# Patient Record
Sex: FEMALE | Race: WHITE | Employment: STUDENT | URBAN - METROPOLITAN AREA
[De-identification: names, ages, dates, MRNs, and addresses within clinical notes are randomized per-mention and may not be internally consistent; named-entity substitution may affect disease eponyms.]

---
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
## 2024-05-16 ENCOUNTER — OFFICE VISIT (OUTPATIENT)
Dept: ORTHOPEDIC SURGERY | Age: 19
End: 2024-05-16
Payer: COMMERCIAL

## 2024-05-16 DIAGNOSIS — S52.124A CLOSED NONDISPLACED FRACTURE OF HEAD OF RIGHT RADIUS, INITIAL ENCOUNTER: Primary | ICD-10-CM

## 2024-05-16 DIAGNOSIS — S59.901A INJURY OF RIGHT ELBOW, INITIAL ENCOUNTER: ICD-10-CM

## 2024-05-16 PROCEDURE — 99203 OFFICE O/P NEW LOW 30 MIN: CPT | Performed by: PHYSICIAN ASSISTANT

## 2024-05-16 RX ORDER — DULOXETIN HYDROCHLORIDE 20 MG/1
CAPSULE, DELAYED RELEASE ORAL
COMMUNITY
Start: 2024-03-29

## 2024-05-16 RX ORDER — IBUPROFEN 800 MG/1
800 TABLET ORAL EVERY 8 HOURS PRN
Qty: 21 TABLET | Refills: 0 | Status: SHIPPED | OUTPATIENT
Start: 2024-05-16 | End: 2024-05-23

## 2024-05-16 NOTE — PROGRESS NOTES
[facility]  New Patient Note      CHIEF COMPLAINT:   Chief Complaint   Patient presents with    Arm Pain     Pt presents this AM with c/o pain in his R elbow. States that he fell yesterday evening. YOSHI. Pain is limiting ROM, specifically into extension. Pain is throughout the joint, and is also noted in distal biceps. Has taken Tylenol and Ibuprofen to manage his symptoms. Pt is R handed.        HISTORY OF PRESENT ILLNESS:                The patient is a 18 y.o. adult who presents today with complaints of right elbow pain x 1 day, followed after he FOOSH yesterday.  Pt has difficulty localizing the pain states it is global throughout the elbow.  Pt denies any numbness, tingling, loss of sensation or radiation of symptoms into fingers.  Pain is worse with palpation and range of motion specifically extension.  They have tried at home therapies of ibuprofen or Tylenol for symptomatic relief as well as ice.  Pt has never injured this elbow in the past.  Pt is right hand dominant.        Past Medical History:        Diagnosis Date    Anxiety     Chronic throat pain     Environmental and seasonal allergies      Past Surgical History:        Procedure Laterality Date    ESOPHAGOGASTRODUODENOSCOPY N/A     MASTECTOMY, BILATERAL Bilateral 2021    TONSILLECTOMY N/A 7/11/2023    TONSILLECTOMY POSTOP HEMORRHAGE CONTROL performed by Dilip Acevedo DO at [facility] OR    TONSILLECTOMY AND ADENOIDECTOMY Bilateral 7/5/2023    TONSILLECTOMY ADENOIDECTOMY performed by Derrek Rodrigues DO at [facility] OR     Current Medications:   No current facility-administered medications for this visit.  Allergies:  Neomycin    Social History:   TOBACCO:   reports that he has never smoked. He has never used smokeless tobacco.  ETOH:   reports current alcohol use.  DRUGS:   reports no history of drug use.    Review of Systems   Constitutional: Negative for fever, chills, diaphoresis, appetite change and fatigue.   HENT:

## 2024-05-16 NOTE — PATIENT INSTRUCTIONS
*At this time I have recommended starting an anti-inflammatory, Ibuprofen 800mg 1 tablet by mouth daily every 8 hours as needed for pain, with GI precautions.  If patient would develop any GI upset, nausea, vomiting, change in appetite, blood in stools they should discontinue the medication and contact our office immediately.  They are also aware that they should not take any other over-the-counter anti-inflammatories while on this.  They can use Tylenol OTC PRN.

## 2024-05-21 ENCOUNTER — OFFICE VISIT (OUTPATIENT)
Dept: ORTHOPEDIC SURGERY | Age: 19
End: 2024-05-21
Payer: COMMERCIAL

## 2024-05-21 VITALS — HEIGHT: 65 IN | BODY MASS INDEX: 23.99 KG/M2 | WEIGHT: 144 LBS

## 2024-05-21 DIAGNOSIS — M25.531 RIGHT WRIST PAIN: ICD-10-CM

## 2024-05-21 DIAGNOSIS — S52.124A CLOSED NONDISPLACED FRACTURE OF HEAD OF RIGHT RADIUS, INITIAL ENCOUNTER: Primary | ICD-10-CM

## 2024-05-21 PROCEDURE — 99213 OFFICE O/P EST LOW 20 MIN: CPT | Performed by: PHYSICIAN ASSISTANT

## 2024-05-21 NOTE — PROGRESS NOTES
elbow.  He is missing approximately 1-2 degrees of extension and approximately 5 degrees of flexion.  He does have discomfort with supported supination and pronation but this has improved since last visit.  Strong radial pulse noted.    Radiology Imaging:  3 view right wrist x-rays obtained in the clinic today which were negative for acute findings of fracture or dislocation.    The imaging will be reviewed and interpreted by Radiologist.  The report was not complete at the time of this note.  Please refer to Radiologist report for their interpretation.        Procedure:  None    Assessment:  Encounter Diagnoses   Name Primary?    Closed nondisplaced fracture of head of right radius, initial encounter Yes    Right wrist pain      Plan:  Pt returns to the clinic today for follow up of nondisplaced right sided radial head fracture.  Today he also complains of right wrist pain.  He states his pain has improved over the last few days.  On exam of the wrist he is mildly tender to palpation at the distal radius.  He does not have much tenderness to palpation at the scaphoid.  I do have to press pretty firmly to elicit any type of discomfort.  He states it is not painful with palpation it does feel different.  The remainder of the hand and wrist exam is unremarkable.  We did did discuss with mom via phone we are going to hold off on getting an MRI of the wrist at this time. I think it is unlikely that there is a scaphoid fracture based on the improvement of his symptoms and minimal tenderness to firm palpation of the anatomical snuffbox.  As for his elbow, I want him to continue table slides.  He has not been doing supported supination pronation as part of his home exercise program.  I do want him to incorporate this at this time.  Wearing to closely monitor his wrist and elbow.  If he reaches a plateau where he is not seeing improvement in the elbow in regards to the last few degrees of range of motion that I do need him

## 2024-06-04 ENCOUNTER — OFFICE VISIT (OUTPATIENT)
Dept: ORTHOPEDIC SURGERY | Age: 19
End: 2024-06-04
Payer: COMMERCIAL

## 2024-06-04 VITALS — BODY MASS INDEX: 23.99 KG/M2 | HEIGHT: 65 IN | WEIGHT: 144 LBS

## 2024-06-04 DIAGNOSIS — S52.124A CLOSED NONDISPLACED FRACTURE OF HEAD OF RIGHT RADIUS, INITIAL ENCOUNTER: Primary | ICD-10-CM

## 2024-06-04 PROCEDURE — 99212 OFFICE O/P EST SF 10 MIN: CPT | Performed by: PHYSICIAN ASSISTANT

## 2024-06-04 NOTE — PROGRESS NOTES
transcribed using voice recognition software. Every effort was made to ensure accuracy; however, inadvertent computerized transcription errors may be present.**

## 2024-07-09 ENCOUNTER — OFFICE VISIT (OUTPATIENT)
Dept: FAMILY MEDICINE CLINIC | Age: 19
End: 2024-07-09
Payer: COMMERCIAL

## 2024-07-09 VITALS
RESPIRATION RATE: 16 BRPM | WEIGHT: 144 LBS | HEIGHT: 65 IN | HEART RATE: 93 BPM | SYSTOLIC BLOOD PRESSURE: 106 MMHG | DIASTOLIC BLOOD PRESSURE: 56 MMHG | BODY MASS INDEX: 23.99 KG/M2 | TEMPERATURE: 98.8 F | OXYGEN SATURATION: 98 %

## 2024-07-09 DIAGNOSIS — Z13.31 POSITIVE DEPRESSION SCREENING: ICD-10-CM

## 2024-07-09 DIAGNOSIS — F41.0 PANIC ATTACK: Primary | ICD-10-CM

## 2024-07-09 PROCEDURE — 99213 OFFICE O/P EST LOW 20 MIN: CPT | Performed by: FAMILY MEDICINE

## 2024-07-09 RX ORDER — LORATADINE 10 MG/1
10 TABLET ORAL DAILY PRN
Status: SHIPPED | COMMUNITY
Start: 2024-07-09

## 2024-07-09 RX ORDER — SERTRALINE HYDROCHLORIDE 25 MG/1
TABLET, FILM COATED ORAL
Qty: 60 TABLET | Refills: 0 | Status: SHIPPED | OUTPATIENT
Start: 2024-07-09

## 2024-07-09 ASSESSMENT — PATIENT HEALTH QUESTIONNAIRE - PHQ9
4. FEELING TIRED OR HAVING LITTLE ENERGY: MORE THAN HALF THE DAYS
SUM OF ALL RESPONSES TO PHQ QUESTIONS 1-9: 13
7. TROUBLE CONCENTRATING ON THINGS, SUCH AS READING THE NEWSPAPER OR WATCHING TELEVISION: NEARLY EVERY DAY
9. THOUGHTS THAT YOU WOULD BE BETTER OFF DEAD, OR OF HURTING YOURSELF: NOT AT ALL
5. POOR APPETITE OR OVEREATING: MORE THAN HALF THE DAYS
SUM OF ALL RESPONSES TO PHQ9 QUESTIONS 1 & 2: 4
6. FEELING BAD ABOUT YOURSELF - OR THAT YOU ARE A FAILURE OR HAVE LET YOURSELF OR YOUR FAMILY DOWN: NOT AT ALL
3. TROUBLE FALLING OR STAYING ASLEEP: MORE THAN HALF THE DAYS
1. LITTLE INTEREST OR PLEASURE IN DOING THINGS: SEVERAL DAYS
10. IF YOU CHECKED OFF ANY PROBLEMS, HOW DIFFICULT HAVE THESE PROBLEMS MADE IT FOR YOU TO DO YOUR WORK, TAKE CARE OF THINGS AT HOME, OR GET ALONG WITH OTHER PEOPLE: SOMEWHAT DIFFICULT
8. MOVING OR SPEAKING SO SLOWLY THAT OTHER PEOPLE COULD HAVE NOTICED. OR THE OPPOSITE - BEING SO FIDGETY OR RESTLESS THAT YOU HAVE BEEN MOVING AROUND A LOT MORE THAN USUAL: NOT AT ALL
2. FEELING DOWN, DEPRESSED OR HOPELESS: NEARLY EVERY DAY

## 2024-07-09 NOTE — PROGRESS NOTES
[facility]  Department of Family Medicine  Family Medicine Residency Program      Patient:  Korey Kirkpatrick 19 y.o. adult  Date of Service: 7/9/24      Chief complaint:   Chief Complaint   Patient presents with    Mood Swings       Assessment and Plan   1. Panic attack  Symptoms most likely explained by mild panic.  Discussed possibility of various meds to assist with this and he is currently moving to Zoloft which seemed to work better in the past.  Overall the environment at school is much more palatable and symptoms are much better controlled when at school.      2. Positive depression screening  No active suicidal thoughts has follow-up with GYN for PCP.      Return to Office: Return if symptoms worsen or fail to improve.    History ofPresent Illness   The patient is a 19 y.o. adult  presented to the clinic with complaints as above.    Has been off Testosterone gel fully for about 2 months.   Has been on norethindrone daily  Duloxetine off for 2 weeks  Zoloft 100 mg on this for 2 weeks.    Chest sx - reports wiers sensation reported like \"fumes\" in chest that goes from xyphoid region into the throat.  Feels like something eating away at the chest.  Occurring consistently but worsens at time. He has had acid reflux in the past and usually spits up acid when this occurs - this current sensation feels nothing like acid reflux.  Also repots history of panic that was treated with zoloft which suppressed but did not resolve the panic sx.  This sensation may have been present prior to the rotation to cymbalta but it might not have b/c if it was it was intermittent and much less intense.  Rotation to cymbalta occurred around Feb 2024 and this occurred and worsened.  This sensation was strongest when on the cymbalta.  The rotation back to zoloft has helped.  Intensity is about 1/2 of what it was previous (on cymbalta) but can still increase that much.  Having 8-9 episodes in a day where the

## 2024-07-19 ENCOUNTER — OFFICE VISIT (OUTPATIENT)
Dept: FAMILY MEDICINE CLINIC | Age: 19
End: 2024-07-19
Payer: COMMERCIAL

## 2024-07-19 VITALS
DIASTOLIC BLOOD PRESSURE: 75 MMHG | OXYGEN SATURATION: 97 % | RESPIRATION RATE: 18 BRPM | SYSTOLIC BLOOD PRESSURE: 108 MMHG | TEMPERATURE: 97.9 F | WEIGHT: 142 LBS | BODY MASS INDEX: 23.63 KG/M2 | HEART RATE: 90 BPM

## 2024-07-19 DIAGNOSIS — E16.2 HYPOGLYCEMIA: Primary | ICD-10-CM

## 2024-07-19 DIAGNOSIS — R00.2 PALPITATIONS: ICD-10-CM

## 2024-07-19 DIAGNOSIS — Z51.81 ENCOUNTER FOR MONITORING TESTOSTERONE REPLACEMENT THERAPY: ICD-10-CM

## 2024-07-19 DIAGNOSIS — R82.998 INAPPROPRIATELY LOW URINE SPECIFIC GRAVITY: Primary | ICD-10-CM

## 2024-07-19 DIAGNOSIS — Z79.890 ENCOUNTER FOR MONITORING TESTOSTERONE REPLACEMENT THERAPY: ICD-10-CM

## 2024-07-19 PROBLEM — J95.830 POST TONSILLECTOMY SECONDARY HEMORRHAGE: Status: RESOLVED | Noted: 2023-07-11 | Resolved: 2024-07-19

## 2024-07-19 PROBLEM — J35.01 CHRONIC TONSILLITIS: Status: RESOLVED | Noted: 2023-06-08 | Resolved: 2024-07-19

## 2024-07-19 PROBLEM — J95.830 POST-TONSILLECTOMY HEMORRHAGE: Status: RESOLVED | Noted: 2023-07-11 | Resolved: 2024-07-19

## 2024-07-19 PROBLEM — G89.18 POST-OP PAIN: Status: RESOLVED | Noted: 2023-07-10 | Resolved: 2024-07-19

## 2024-07-19 LAB
ALBUMIN: 4.8 G/DL (ref 3.5–5.2)
ALP BLD-CCNC: 104 U/L (ref 35–104)
ALT SERPL-CCNC: 10 U/L (ref 0–32)
ANION GAP SERPL CALCULATED.3IONS-SCNC: 15 MMOL/L (ref 7–16)
AST SERPL-CCNC: 18 U/L (ref 0–31)
BILIRUB SERPL-MCNC: 0.3 MG/DL (ref 0–1.2)
BILIRUBIN, POC: NORMAL
BLOOD URINE, POC: NORMAL
BUN BLDV-MCNC: 9 MG/DL (ref 6–20)
CALCIUM SERPL-MCNC: 9.4 MG/DL (ref 8.6–10.2)
CHLORIDE BLD-SCNC: 101 MMOL/L (ref 98–107)
CHP ED QC CHECK: NORMAL
CLARITY, POC: CLEAR
CO2: 20 MMOL/L (ref 22–29)
COLOR, POC: NORMAL
CREAT SERPL-MCNC: 0.7 MG/DL (ref 0.5–1)
GFR, ESTIMATED: >90 ML/MIN/1.73M2
GLUCOSE BLD-MCNC: 84 MG/DL (ref 74–99)
GLUCOSE BLD-MCNC: 93 MG/DL
GLUCOSE URINE, POC: NORMAL
HBA1C MFR BLD: 5.2 % (ref 4–5.6)
KETONES, POC: NORMAL
LEUKOCYTE EST, POC: NORMAL
MAGNESIUM: 2.3 MG/DL (ref 1.6–2.6)
NITRITE, POC: NORMAL
OSMOLALITY URINE: 357 MOSM/KG (ref 300–900)
PH, POC: 6.5
POTASSIUM SERPL-SCNC: 4.5 MMOL/L (ref 3.5–5)
PROTEIN, POC: NORMAL
SODIUM BLD-SCNC: 136 MMOL/L (ref 132–146)
SPECIFIC GRAVITY, POC: 1.01
T3 FREE: 2.8 PG/ML (ref 2–4.4)
T4 FREE: 1.4 NG/DL (ref 0.9–1.7)
TESTOSTERONE TOTAL: 20 NG/DL (ref 8–48)
TOTAL PROTEIN: 8.1 G/DL (ref 6.4–8.3)
TSH SERPL DL<=0.05 MIU/L-ACNC: 2.04 UIU/ML (ref 0.27–4.2)
UROBILINOGEN, POC: 0.2

## 2024-07-19 PROCEDURE — 81002 URINALYSIS NONAUTO W/O SCOPE: CPT | Performed by: FAMILY MEDICINE

## 2024-07-19 PROCEDURE — 99214 OFFICE O/P EST MOD 30 MIN: CPT | Performed by: FAMILY MEDICINE

## 2024-07-19 PROCEDURE — 82962 GLUCOSE BLOOD TEST: CPT | Performed by: FAMILY MEDICINE

## 2024-07-19 ASSESSMENT — ENCOUNTER SYMPTOMS
ABDOMINAL PAIN: 0
TROUBLE SWALLOWING: 0
SHORTNESS OF BREATH: 0
NAUSEA: 1

## 2024-07-19 NOTE — PROGRESS NOTES
Korey Kirkpatrick is a 19 y.o. year old male Established patient, here for evaluation of the following chief complaint(s):    Chief Complaint   Patient presents with    Blood Sugar Problem     States he's been checking blood sugars at home and they have been getting into the 40's recently.         Korey was seen today for blood sugar problem.    Diagnoses and all orders for this visit:    Hypoglycemia  -     POCT Glucose  -     Cancel: Comprehensive Metabolic Panel  -     Cancel: Hemoglobin A1C  -     Cancel: TSH  -     Cancel: Insulin, total; Future  -     Cancel: Insulin Antibody; Future  -     Cancel: C-Peptide; Future  -     T3, Free  -     Cancel: T4, Free; Future  -     POCT Urinalysis no Micro  -     TSH  -     Insulin, total  -     Insulin Antibody  -     T4, Free  -     Hemoglobin A1C  -     Comprehensive Metabolic Panel  -     C-Peptide    Palpitations  -     Cancel: Magnesium  -     Cardiac holter monitor (1 day-2 day); Future  -     Magnesium    Encounter for monitoring testosterone replacement therapy  -     Cancel: TSH  -     Cancel: Testosterone  -     TSH  -     Testosterone      Plan    Differential diagnosis for hypoglycemia reviewed.  Insulinoma versus exogenous administration of insulin versus uncontrolled diabetes or impaired glucose tolerance versus other endocrinologic problems.  Checking labs as above to look for metabolic dysfunction or dysregulation    Concern over palpitations will pursue 2-day Holter monitor along with magnesium check    Checking testosterone levels    Patient/guardian was given the opportunity to ask questions regarding the visit today.  Questions were addressed.  They verbalized comfort and understanding of the assessment and plan.    Return for 1-2 weeks after imaging/testing to review.    SUBJECTIVE/OBJECTIVE:    HPI  Concerns for hypoglycemia  Patient has been experiencing vague symptoms of midline chest discomfort, he describes it as a \"black smoke\" sensation that

## 2024-07-23 LAB
C-PEPTIDE: 2.1 NG/ML (ref 1.1–4.4)
INSULIN COMMENT: NORMAL
INSULIN REFERENCE RANGE:: NORMAL
INSULIN: 10.8 MU/L

## 2024-07-26 LAB — HUMAN INSULIN ANTIBODY: <0.4 U/ML (ref 0–0.4)

## 2024-08-02 ENCOUNTER — OFFICE VISIT (OUTPATIENT)
Dept: FAMILY MEDICINE CLINIC | Age: 19
End: 2024-08-02

## 2024-08-02 VITALS
OXYGEN SATURATION: 98 % | WEIGHT: 142.2 LBS | HEIGHT: 65 IN | TEMPERATURE: 97.6 F | HEART RATE: 95 BPM | SYSTOLIC BLOOD PRESSURE: 91 MMHG | BODY MASS INDEX: 23.69 KG/M2 | DIASTOLIC BLOOD PRESSURE: 63 MMHG

## 2024-08-02 DIAGNOSIS — Z51.81 ENCOUNTER FOR MONITORING TESTOSTERONE REPLACEMENT THERAPY: Primary | ICD-10-CM

## 2024-08-02 DIAGNOSIS — Z79.890 ENCOUNTER FOR MONITORING TESTOSTERONE REPLACEMENT THERAPY: Primary | ICD-10-CM

## 2024-08-02 DIAGNOSIS — F64.0 TRANSGENDER PERSON ON HORMONE THERAPY: ICD-10-CM

## 2024-08-02 DIAGNOSIS — Z79.899 TRANSGENDER PERSON ON HORMONE THERAPY: ICD-10-CM

## 2024-08-02 DIAGNOSIS — J34.89 SORE IN NOSE: ICD-10-CM

## 2024-08-02 PROBLEM — F64.9 GENDER DYSPHORIA: Status: ACTIVE | Noted: 2021-01-20

## 2024-08-02 RX ORDER — TESTOSTERONE 20.25 MG/1.25G
1 GEL TOPICAL DAILY
Qty: 225 G | Refills: 1 | Status: SHIPPED | OUTPATIENT
Start: 2024-08-02 | End: 2025-01-29

## 2024-08-02 RX ORDER — DOXYCYCLINE HYCLATE 100 MG
100 TABLET ORAL 2 TIMES DAILY
Qty: 14 TABLET | Refills: 0 | Status: SHIPPED | OUTPATIENT
Start: 2024-08-02 | End: 2024-08-09

## 2024-08-02 NOTE — PROGRESS NOTES
Korey Kirkpatrick is a 19 y.o. year old male Established patient, here for evaluation of the following chief complaint(s):    Chief Complaint   Patient presents with    Discuss Medications         Korey was seen today for discuss medications.    Diagnoses and all orders for this visit:    Encounter for monitoring testosterone replacement therapy  -     Testosterone 1.62 % GEL; Place 1 actuation onto the skin daily Max Daily Amount: 1 actuation  -     CBC with Auto Differential; Future  -     Comprehensive Metabolic Panel; Future  -     Testosterone; Future  -     Lipid Panel; Future    Transgender person on hormone therapy  -     CBC with Auto Differential; Future  -     Comprehensive Metabolic Panel; Future  -     Testosterone; Future  -     Lipid Panel; Future    Sore in nose  -     doxycycline hyclate (VIBRA-TABS) 100 MG tablet; Take 1 tablet by mouth 2 times daily for 7 days      Plan  Restart testosterone therapy through this office.  Risks benefits and side effects of therapy reviewed with patient  Controlled substances monitoring: possible medication side effects, risk of tolerance and/or dependence, and alternative treatments discussed, OARRS report reviewed today- activity consistent with treatment plan, and medication contract signed today.  Need routine labs every 3 months until levels have stabilized.  Advised would like to have a in person or video visit with the patient roughly every 3 months until issues stabilize    Sore in the nose not responding to topical antibiotics, trial of oral antibiotics.  Side effects including sun sensitivity while on this medication reviewed.  Notify if not improving after completing therapy      Patient/guardian was given the opportunity to ask questions regarding the visit today.  Questions were addressed.  They verbalized comfort and understanding of the assessment and plan.    Return in about 3 months (around 11/2/2024), or if symptoms worsen or fail to improve, for

## 2024-09-23 RX ORDER — SERTRALINE HYDROCHLORIDE 100 MG/1
150 TABLET, FILM COATED ORAL DAILY
Qty: 135 TABLET | Refills: 0 | Status: SHIPPED | OUTPATIENT
Start: 2024-09-23

## 2024-09-23 RX ORDER — SERTRALINE HYDROCHLORIDE 25 MG/1
TABLET, FILM COATED ORAL
Qty: 60 TABLET | Refills: 0 | Status: CANCELLED | OUTPATIENT
Start: 2024-09-23

## 2024-10-08 ENCOUNTER — PATIENT MESSAGE (OUTPATIENT)
Dept: OBGYN | Age: 19
End: 2024-10-08

## 2024-10-22 NOTE — TELEPHONE ENCOUNTER
Good Dori Welch,    I just wanted to keep you abreast of the prior authorization situation. Since my last message, I have sent it through two more companies and both stated they were not in Network.  Dr. Yang is aware and I see you have a phone visit scheduled with her to discuss.   Should anything else arise, such as an approval or another company we have found to send a prior authorization, I will be sure to notify you. We apologize for the delay.  Enjoy the rest of your day!

## 2024-11-03 SDOH — ECONOMIC STABILITY: FOOD INSECURITY: WITHIN THE PAST 12 MONTHS, YOU WORRIED THAT YOUR FOOD WOULD RUN OUT BEFORE YOU GOT MONEY TO BUY MORE.: NEVER TRUE

## 2024-11-03 SDOH — ECONOMIC STABILITY: FOOD INSECURITY: WITHIN THE PAST 12 MONTHS, THE FOOD YOU BOUGHT JUST DIDN'T LAST AND YOU DIDN'T HAVE MONEY TO GET MORE.: NEVER TRUE

## 2024-11-03 SDOH — ECONOMIC STABILITY: TRANSPORTATION INSECURITY
IN THE PAST 12 MONTHS, HAS LACK OF TRANSPORTATION KEPT YOU FROM MEETINGS, WORK, OR FROM GETTING THINGS NEEDED FOR DAILY LIVING?: NO

## 2024-11-03 SDOH — ECONOMIC STABILITY: INCOME INSECURITY: HOW HARD IS IT FOR YOU TO PAY FOR THE VERY BASICS LIKE FOOD, HOUSING, MEDICAL CARE, AND HEATING?: NOT HARD AT ALL

## 2024-11-04 ENCOUNTER — TELEMEDICINE (OUTPATIENT)
Dept: FAMILY MEDICINE CLINIC | Age: 19
End: 2024-11-04

## 2024-11-04 DIAGNOSIS — R00.2 PALPITATIONS: ICD-10-CM

## 2024-11-04 DIAGNOSIS — Z79.890 ENCOUNTER FOR MONITORING TESTOSTERONE REPLACEMENT THERAPY: Primary | ICD-10-CM

## 2024-11-04 DIAGNOSIS — F41.0 PANIC ATTACK: ICD-10-CM

## 2024-11-04 DIAGNOSIS — Z51.81 ENCOUNTER FOR MONITORING TESTOSTERONE REPLACEMENT THERAPY: Primary | ICD-10-CM

## 2024-11-27 ENCOUNTER — LAB (OUTPATIENT)
Dept: FAMILY MEDICINE CLINIC | Age: 19
End: 2024-11-27

## 2024-11-27 DIAGNOSIS — Z79.899 TRANSGENDER PERSON ON HORMONE THERAPY: ICD-10-CM

## 2024-11-27 DIAGNOSIS — Z51.81 ENCOUNTER FOR MONITORING TESTOSTERONE REPLACEMENT THERAPY: ICD-10-CM

## 2024-11-27 DIAGNOSIS — F64.0 TRANSGENDER PERSON ON HORMONE THERAPY: ICD-10-CM

## 2024-11-27 DIAGNOSIS — Z79.890 ENCOUNTER FOR MONITORING TESTOSTERONE REPLACEMENT THERAPY: ICD-10-CM

## 2024-11-27 LAB
ALBUMIN: 4.5 G/DL (ref 3.5–5.2)
ALP BLD-CCNC: 100 U/L (ref 35–104)
ALT SERPL-CCNC: 13 U/L (ref 0–32)
ANION GAP SERPL CALCULATED.3IONS-SCNC: 13 MMOL/L (ref 7–16)
AST SERPL-CCNC: 17 U/L (ref 0–31)
BILIRUB SERPL-MCNC: 0.3 MG/DL (ref 0–1.2)
BUN BLDV-MCNC: 8 MG/DL (ref 6–20)
CALCIUM SERPL-MCNC: 9.2 MG/DL (ref 8.6–10.2)
CHLORIDE BLD-SCNC: 104 MMOL/L (ref 98–107)
CHOLESTEROL, TOTAL: 186 MG/DL
CO2: 20 MMOL/L (ref 22–29)
CREAT SERPL-MCNC: 0.6 MG/DL (ref 0.5–1)
GFR, ESTIMATED: >90 ML/MIN/1.73M2
GLUCOSE BLD-MCNC: 90 MG/DL (ref 74–99)
HDLC SERPL-MCNC: 39 MG/DL
LDL CHOLESTEROL: 123 MG/DL
POTASSIUM SERPL-SCNC: 4.6 MMOL/L (ref 3.5–5)
SODIUM BLD-SCNC: 137 MMOL/L (ref 132–146)
TESTOSTERONE TOTAL: >1500 NG/DL (ref 8–48)
TOTAL PROTEIN: 7.6 G/DL (ref 6.4–8.3)
TRIGL SERPL-MCNC: 120 MG/DL
VLDLC SERPL CALC-MCNC: 24 MG/DL

## 2024-11-29 ENCOUNTER — TELEPHONE (OUTPATIENT)
Dept: FAMILY MEDICINE CLINIC | Age: 19
End: 2024-11-29

## 2024-11-29 DIAGNOSIS — F64.0 TRANSGENDER PERSON ON HORMONE THERAPY: ICD-10-CM

## 2024-11-29 DIAGNOSIS — Z79.890 ENCOUNTER FOR MONITORING TESTOSTERONE REPLACEMENT THERAPY: Primary | ICD-10-CM

## 2024-11-29 DIAGNOSIS — Z79.899 TRANSGENDER PERSON ON HORMONE THERAPY: ICD-10-CM

## 2024-11-29 DIAGNOSIS — Z51.81 ENCOUNTER FOR MONITORING TESTOSTERONE REPLACEMENT THERAPY: Primary | ICD-10-CM

## 2024-11-29 NOTE — TELEPHONE ENCOUNTER
Lab called this morning:    CBCWD needs recollected- Specimen was clotted and unable to be ran.     Please call pt and let him know/place new order

## 2024-12-06 ENCOUNTER — TELEPHONE (OUTPATIENT)
Age: 19
End: 2024-12-06

## 2024-12-06 NOTE — TELEPHONE ENCOUNTER
Received call from patients mom Christine Kirkpatrick , stating the Mirena IUD has been approved they paid the co pay and it will be shipped on 12/12/2024.

## 2024-12-13 ENCOUNTER — SCHEDULED TELEPHONE ENCOUNTER (OUTPATIENT)
Dept: OBGYN | Age: 19
End: 2024-12-13
Payer: COMMERCIAL

## 2024-12-13 DIAGNOSIS — N92.6 IRREGULAR BLEEDING: Primary | ICD-10-CM

## 2024-12-13 DIAGNOSIS — Z86.59 HISTORY OF ANXIETY: ICD-10-CM

## 2024-12-13 PROCEDURE — 99441 PR PHYS/QHP TELEPHONE EVALUATION 5-10 MIN: CPT | Performed by: FAMILY MEDICINE

## 2024-12-13 RX ORDER — LORAZEPAM 0.5 MG/1
0.5 TABLET ORAL PRN
Qty: 2 TABLET | Refills: 0 | Status: SHIPPED | OUTPATIENT
Start: 2024-12-13 | End: 2024-12-21

## 2024-12-13 RX ORDER — IBUPROFEN 800 MG/1
800 TABLET, FILM COATED ORAL EVERY 8 HOURS PRN
Qty: 30 TABLET | Refills: 1 | Status: SHIPPED | OUTPATIENT
Start: 2024-12-13

## 2024-12-19 NOTE — PROGRESS NOTES
IUD Insertion Procedure Note    Pre-operative Diagnosis: AUB  Transgender male on testosterone    Post-operative Diagnosis: normal    Indications: cycle regulation    Procedure Details    Urine pregnancy test was done and negative. Declined GC/CT due to recent negative tests. The risks (including infection, bleeding, pain, and uterine perforation) and benefits of the procedure were explained to the patient and Written informed consent was obtained.      Cervix cleansed with Betadine. Uterus sounded to 3-3.5 cm. Tenaculum applied to anterior lip of cervix. IUD expulsed on 2 attempts. Procedure then discontinued and instruments removed. No bleeding observed. Patient tolerated procedure well.    Condition:  Stable    Complications:  None    Plan:  IUD unable to be placed due to small uterine size. Explained to patient and parent. Does not want to reattempt and plans to continue POP.   The patient was advised to call for any fever or for prolonged or severe pain or bleeding. Advised to use OTC ibuprofen as needed for mild to moderate pain.

## 2024-12-20 ENCOUNTER — PROCEDURE VISIT (OUTPATIENT)
Dept: OBGYN | Age: 19
End: 2024-12-20

## 2024-12-20 VITALS
TEMPERATURE: 98.6 F | BODY MASS INDEX: 24.39 KG/M2 | SYSTOLIC BLOOD PRESSURE: 99 MMHG | HEIGHT: 65 IN | RESPIRATION RATE: 18 BRPM | DIASTOLIC BLOOD PRESSURE: 60 MMHG | WEIGHT: 146.4 LBS | HEART RATE: 70 BPM

## 2024-12-20 DIAGNOSIS — N92.6 IRREGULAR BLEEDING: Primary | ICD-10-CM

## 2024-12-20 LAB
CONTROL: PRESENT
PREGNANCY TEST URINE, POC: NEGATIVE

## 2024-12-20 NOTE — PROGRESS NOTES
Pt presents for IUD insertion  Urine pregnancy test negative.  Consent obtained and signed by pt, provider and this MA.   Medications and allergies reviewed with pt.   Procedural timeout preformed prior to start of procedure.    IUD insertion was not completed, pt unable to complete due to high pain level. IUD disposed of in black bin.

## 2024-12-23 RX ORDER — SERTRALINE HYDROCHLORIDE 100 MG/1
TABLET, FILM COATED ORAL
Qty: 135 TABLET | Refills: 1 | Status: SHIPPED | OUTPATIENT
Start: 2024-12-23

## 2024-12-23 NOTE — TELEPHONE ENCOUNTER
Name of Medication(s) Requested:  Requested Prescriptions     Pending Prescriptions Disp Refills    sertraline (ZOLOFT) 100 MG tablet [Pharmacy Med Name: SERTRALINE HCL 100MG TABS] 135 tablet 0     Sig: TAKE ONE AND ONE-HALF TABLETS BY MOUTH ONCE A DAY       Medication is on current medication list Yes    Dosage and directions were verified? Yes    Quantity verified: 90 day supply     Pharmacy Verified?  Yes    Last Appointment:  11/4/2024    Future appts:  Aware appt needed, will schedule in the new year.    (If no appt send self scheduling link. .REFILLAPPT)  Scheduling request sent?     [] Yes  [x] No    Does patient need updated?  [] Yes  [x] No

## 2024-12-26 RX ORDER — SERTRALINE HYDROCHLORIDE 100 MG/1
TABLET, FILM COATED ORAL
Qty: 135 TABLET | Refills: 1 | OUTPATIENT
Start: 2024-12-26

## 2025-04-05 ENCOUNTER — PATIENT MESSAGE (OUTPATIENT)
Dept: FAMILY MEDICINE CLINIC | Age: 20
End: 2025-04-05

## 2025-04-05 DIAGNOSIS — Z51.81 ENCOUNTER FOR MONITORING TESTOSTERONE REPLACEMENT THERAPY: ICD-10-CM

## 2025-04-05 DIAGNOSIS — Z79.890 ENCOUNTER FOR MONITORING TESTOSTERONE REPLACEMENT THERAPY: ICD-10-CM

## 2025-04-07 RX ORDER — NORETHINDRONE 5 MG/1
2.5 TABLET ORAL DAILY
Qty: 45 TABLET | Refills: 3 | Status: SHIPPED | OUTPATIENT
Start: 2025-04-07

## 2025-04-07 RX ORDER — TESTOSTERONE 20.25 MG/1.25G
1 GEL TOPICAL DAILY
Qty: 225 G | Refills: 1 | Status: SHIPPED | OUTPATIENT
Start: 2025-04-07 | End: 2025-10-04

## 2025-04-07 NOTE — TELEPHONE ENCOUNTER
Name of Medication(s) Requested:  Requested Prescriptions     Pending Prescriptions Disp Refills    Testosterone 1.62 %  g 1     Sig: Place 1 actuation onto the skin daily Max Daily Amount: 1 actuation       Medication is on current medication list Yes    Dosage and directions were verified? Yes    Quantity verified: 90 day supply     Pharmacy Verified?  Yes    Last Appointment:  11/4/2024    Future appts:  No future appointments.     (If no appt send self scheduling link. .REFILLAPPT)  Scheduling request sent?     [] Yes  [x] No    Does patient need updated?  [] Yes  [x] No

## 2025-04-09 ENCOUNTER — TELEPHONE (OUTPATIENT)
Dept: FAMILY MEDICINE CLINIC | Age: 20
End: 2025-04-09

## 2025-04-09 DIAGNOSIS — Z51.81 ENCOUNTER FOR MONITORING TESTOSTERONE REPLACEMENT THERAPY: Primary | ICD-10-CM

## 2025-04-09 DIAGNOSIS — Z79.890 ENCOUNTER FOR MONITORING TESTOSTERONE REPLACEMENT THERAPY: Primary | ICD-10-CM

## 2025-04-09 NOTE — TELEPHONE ENCOUNTER
Last Appointment   11/4/2024  Next Appointment  5/15/2025    Patient states that he can have lab work performed at a [facility] in [place] if they are needed. Please let Dr. Kirkpatrick know if labs need performed prior to upcoming appointment.

## 2025-04-15 ENCOUNTER — HOSPITAL ENCOUNTER (OUTPATIENT)
Age: 20
Discharge: HOME | End: 2025-04-15
Payer: COMMERCIAL

## 2025-04-15 DIAGNOSIS — Z79.890 ENCOUNTER FOR MONITORING TESTOSTERONE REPLACEMENT THERAPY: ICD-10-CM

## 2025-04-15 DIAGNOSIS — Z51.81 ENCOUNTER FOR MONITORING TESTOSTERONE REPLACEMENT THERAPY: ICD-10-CM

## 2025-04-15 LAB
ALBUMIN SERPL-MCNC: 4.7 G/DL (ref 3.4–5)
ALBUMIN/GLOB SERPL: 1.6 {RATIO} (ref 1.1–2.2)
ALP SERPL-CCNC: 91 U/L (ref 40–129)
ALT SERPL-CCNC: 21 U/L (ref 10–40)
ANION GAP SERPL CALCULATED.3IONS-SCNC: 13 MMOL/L (ref 3–16)
AST SERPL-CCNC: 23 U/L (ref 15–37)
BASOPHILS # BLD: 0.1 K/UL (ref 0–0.2)
BASOPHILS NFR BLD: 0.7 %
BILIRUB SERPL-MCNC: <0.2 MG/DL (ref 0–1)
BUN SERPL-MCNC: 12 MG/DL (ref 7–20)
CALCIUM SERPL-MCNC: 9.7 MG/DL (ref 8.3–10.6)
CHLORIDE SERPL-SCNC: 101 MMOL/L (ref 99–110)
CHOLEST SERPL-MCNC: 181 MG/DL (ref 0–199)
CO2 SERPL-SCNC: 22 MMOL/L (ref 21–32)
CREAT SERPL-MCNC: 0.9 MG/DL (ref 0.6–1.1)
DEPRECATED RDW RBC AUTO: 12 % (ref 12.4–15.4)
EOSINOPHIL # BLD: 0.3 K/UL (ref 0–0.6)
EOSINOPHIL NFR BLD: 3.3 %
GFR SERPLBLD CREATININE-BSD FMLA CKD-EPI: >90 ML/MIN/{1.73_M2}
GLUCOSE SERPL-MCNC: 107 MG/DL (ref 70–99)
HCT VFR BLD AUTO: 40.5 % (ref 36–48)
HDLC SERPL-MCNC: 39 MG/DL (ref 40–60)
HGB BLD-MCNC: 13.6 G/DL (ref 12–16)
LDLC SERPL CALC-MCNC: 114 MG/DL
LYMPHOCYTES # BLD: 4.1 K/UL (ref 1–5.1)
LYMPHOCYTES NFR BLD: 44.9 %
MCH RBC QN AUTO: 28.4 PG (ref 26–34)
MCHC RBC AUTO-ENTMCNC: 33.6 G/DL (ref 31–36)
MCV RBC AUTO: 84.5 FL (ref 80–100)
MONOCYTES # BLD: 0.6 K/UL (ref 0–1.3)
MONOCYTES NFR BLD: 6.2 %
NEUTROPHILS # BLD: 4.1 K/UL (ref 1.7–7.7)
NEUTROPHILS NFR BLD: 44.9 %
PLATELET # BLD AUTO: 310 K/UL (ref 135–450)
PMV BLD AUTO: 8.6 FL (ref 5–10.5)
POTASSIUM SERPL-SCNC: 3.8 MMOL/L (ref 3.5–5.1)
PROT SERPL-MCNC: 7.6 G/DL (ref 6.4–8.2)
RBC # BLD AUTO: 4.79 M/UL (ref 4–5.2)
SODIUM SERPL-SCNC: 136 MMOL/L (ref 136–145)
TRIGL SERPL-MCNC: 140 MG/DL (ref 0–150)
VLDLC SERPL CALC-MCNC: 28 MG/DL
WBC # BLD AUTO: 9.2 K/UL (ref 4–11)

## 2025-04-15 PROCEDURE — 85025 COMPLETE CBC W/AUTO DIFF WBC: CPT

## 2025-04-15 PROCEDURE — 36415 COLL VENOUS BLD VENIPUNCTURE: CPT

## 2025-04-15 PROCEDURE — 84403 ASSAY OF TOTAL TESTOSTERONE: CPT

## 2025-04-15 PROCEDURE — 80061 LIPID PANEL: CPT

## 2025-04-15 PROCEDURE — 80053 COMPREHEN METABOLIC PANEL: CPT

## 2025-04-18 ENCOUNTER — RESULTS FOLLOW-UP (OUTPATIENT)
Dept: FAMILY MEDICINE CLINIC | Age: 20
End: 2025-04-18

## 2025-04-18 LAB — TESTOST SERPL-MCNC: 46 NG/DL (ref 8–48)

## 2025-05-12 SDOH — ECONOMIC STABILITY: FOOD INSECURITY: WITHIN THE PAST 12 MONTHS, YOU WORRIED THAT YOUR FOOD WOULD RUN OUT BEFORE YOU GOT MONEY TO BUY MORE.: NEVER TRUE

## 2025-05-12 SDOH — ECONOMIC STABILITY: TRANSPORTATION INSECURITY
IN THE PAST 12 MONTHS, HAS THE LACK OF TRANSPORTATION KEPT YOU FROM MEDICAL APPOINTMENTS OR FROM GETTING MEDICATIONS?: NO

## 2025-05-12 SDOH — ECONOMIC STABILITY: INCOME INSECURITY: IN THE LAST 12 MONTHS, WAS THERE A TIME WHEN YOU WERE NOT ABLE TO PAY THE MORTGAGE OR RENT ON TIME?: NO

## 2025-05-12 SDOH — ECONOMIC STABILITY: FOOD INSECURITY: WITHIN THE PAST 12 MONTHS, THE FOOD YOU BOUGHT JUST DIDN'T LAST AND YOU DIDN'T HAVE MONEY TO GET MORE.: NEVER TRUE

## 2025-05-15 ENCOUNTER — TELEMEDICINE (OUTPATIENT)
Dept: FAMILY MEDICINE CLINIC | Age: 20
End: 2025-05-15
Payer: COMMERCIAL

## 2025-05-15 DIAGNOSIS — M25.551 HIP PAIN, BILATERAL: Primary | ICD-10-CM

## 2025-05-15 DIAGNOSIS — M25.552 HIP PAIN, BILATERAL: Primary | ICD-10-CM

## 2025-05-15 DIAGNOSIS — Z51.81 ENCOUNTER FOR MONITORING TESTOSTERONE REPLACEMENT THERAPY: ICD-10-CM

## 2025-05-15 DIAGNOSIS — F41.0 PANIC ATTACK: ICD-10-CM

## 2025-05-15 DIAGNOSIS — Z79.890 ENCOUNTER FOR MONITORING TESTOSTERONE REPLACEMENT THERAPY: ICD-10-CM

## 2025-05-15 PROCEDURE — 99214 OFFICE O/P EST MOD 30 MIN: CPT | Performed by: FAMILY MEDICINE

## 2025-05-15 ASSESSMENT — PATIENT HEALTH QUESTIONNAIRE - PHQ9
10. IF YOU CHECKED OFF ANY PROBLEMS, HOW DIFFICULT HAVE THESE PROBLEMS MADE IT FOR YOU TO DO YOUR WORK, TAKE CARE OF THINGS AT HOME, OR GET ALONG WITH OTHER PEOPLE: NOT DIFFICULT AT ALL
SUM OF ALL RESPONSES TO PHQ QUESTIONS 1-9: 4
7. TROUBLE CONCENTRATING ON THINGS, SUCH AS READING THE NEWSPAPER OR WATCHING TELEVISION: NOT AT ALL
5. POOR APPETITE OR OVEREATING: NOT AT ALL
6. FEELING BAD ABOUT YOURSELF - OR THAT YOU ARE A FAILURE OR HAVE LET YOURSELF OR YOUR FAMILY DOWN: NOT AT ALL
SUM OF ALL RESPONSES TO PHQ QUESTIONS 1-9: 4
SUM OF ALL RESPONSES TO PHQ QUESTIONS 1-9: 4
3. TROUBLE FALLING OR STAYING ASLEEP: NOT AT ALL
4. FEELING TIRED OR HAVING LITTLE ENERGY: NOT AT ALL
8. MOVING OR SPEAKING SO SLOWLY THAT OTHER PEOPLE COULD HAVE NOTICED. OR THE OPPOSITE, BEING SO FIGETY OR RESTLESS THAT YOU HAVE BEEN MOVING AROUND A LOT MORE THAN USUAL: NOT AT ALL
1. LITTLE INTEREST OR PLEASURE IN DOING THINGS: SEVERAL DAYS
SUM OF ALL RESPONSES TO PHQ QUESTIONS 1-9: 4
9. THOUGHTS THAT YOU WOULD BE BETTER OFF DEAD, OR OF HURTING YOURSELF: NOT AT ALL
2. FEELING DOWN, DEPRESSED OR HOPELESS: NEARLY EVERY DAY

## 2025-05-15 ASSESSMENT — ENCOUNTER SYMPTOMS
ABDOMINAL PAIN: 0
BACK PAIN: 1

## 2025-05-15 NOTE — PROGRESS NOTES
5/15/2025    TELEHEALTH EVALUATION -- Audio/Visual    HPI:    Korey HERNANDEZ TEN Kirkpatrick (:  2005) has requested an audio/video evaluation for the following concern(s):      Here for recheck of testosterone therapy  Restarted on low-dose   Tolerating well  However labs initially were spuriously high so dose had been reduced.  Recent labs subsequently showed levels that were too low.  Dose was moved back to 1 pump daily.  Noting secondary sexual characteristics of facial hair  Denies any change in mood or behavior  Pleased with progress      LBP and hip pain  New issue with me but actually describes it as a recurring issue from childhood.  Described hip flexors as being tight  Having some posterior hip and low back pain.  Not improving with HEP  Easily irritated by lifting bending  Interfering with sleep  Trying OTC Motrin and topical analgesic with minimal relief     Anxiety  Follow-up  States doing well on present dose of Zoloft.    Feels coping with stressors adequately  No medication side effects reported  No sleep disruption with the exception of the above back pain waking him up  Remains in counseling     Has had no recurrence of palpitations or low blood sugar feelings.  Now believes this was situational       Review of Systems   Constitutional:  Positive for fatigue.   Gastrointestinal:  Negative for abdominal pain.   Musculoskeletal:  Positive for arthralgias and back pain.       Prior to Visit Medications    Medication Sig Taking? Authorizing Provider   Testosterone 1.62 % GEL Place 1 actuation onto the skin daily Max Daily Amount: 1 actuation Yes Clemente Javier MD   norethindrone (AYGESTIN) 5 MG tablet Take 0.5 tablets by mouth daily Yes Clemente Javier MD   sertraline (ZOLOFT) 100 MG tablet TAKE ONE AND ONE-HALF TABLETS BY MOUTH ONCE A DAY Yes Clemente Javier MD   loratadine (CLARITIN) 10 MG tablet Take 1 tablet by mouth daily as needed Yes Derrek Engel MD   Multiple Vitamin (MULTIVITAMIN

## 2025-05-15 NOTE — PROGRESS NOTES
TeleMedicine Patient Consent    This visit was performed as a virtual video visit using a synchronous, two-way, audio-video telehealth technology platform. Patient identification was verified at the start of the visit, including the patient's telephone number and physical location. I discussed with the patient the nature of our telehealth visits, that:     Due to the nature of an audio- video modality, the only components of a physical exam that could be done are the elements supported by direct observation.  The provider will evaluate the patient and recommend diagnostics and treatments based on their assessment.  If it was felt that the patient should be evaluated in clinic or an emergency room setting, then they would be directed there.  Our sessions are not being recorded and that personal health information is protected.  Our team would provide follow up care in person if/when the patient needs it.       Patient does agree to proceed with telemedicine consultation.    Patient location: home address in [place]    Physician location: regular office location    This visit was completed virtually using My Chart/Haiku/Jennifer    This visit was performed during the 2020 public health crisis and COVID-19 pandemic.  *Add 95 modifier to all Video Visits*

## 2025-07-07 RX ORDER — SERTRALINE HYDROCHLORIDE 100 MG/1
TABLET, FILM COATED ORAL
Qty: 135 TABLET | Refills: 1 | Status: SHIPPED | OUTPATIENT
Start: 2025-07-07

## 2025-07-07 NOTE — TELEPHONE ENCOUNTER
Name of Medication(s) Requested:  Requested Prescriptions     Pending Prescriptions Disp Refills    sertraline (ZOLOFT) 100 MG tablet [Pharmacy Med Name: SERTRALINE HCL 100MG TABS] 135 tablet 1     Sig: TAKE ONE AND ONE-HALF TABLETS BY MOUTH ONCE A DAY       Medication is on current medication list Yes    Dosage and directions were verified? Yes    Quantity verified: 90 day supply     Pharmacy Verified?  Yes    Last Appointment:  5/15/2025    Future appts:  Future Appointments   Date Time Provider Department Center   8/21/2025 11:45 AM Clemente Javier MD [facility]        (If no appt send self scheduling link. .REFILLAPPT)  Scheduling request sent?     [] Yes  [x] No    Does patient need updated?  [] Yes  [x] No

## 2025-08-14 DIAGNOSIS — Z79.890 ENCOUNTER FOR MONITORING TESTOSTERONE REPLACEMENT THERAPY: ICD-10-CM

## 2025-08-14 DIAGNOSIS — Z51.81 ENCOUNTER FOR MONITORING TESTOSTERONE REPLACEMENT THERAPY: ICD-10-CM

## 2025-08-21 ENCOUNTER — TELEMEDICINE (OUTPATIENT)
Dept: FAMILY MEDICINE CLINIC | Age: 20
End: 2025-08-21
Payer: COMMERCIAL

## 2025-08-21 DIAGNOSIS — Z51.81 ENCOUNTER FOR MONITORING TESTOSTERONE REPLACEMENT THERAPY: Primary | ICD-10-CM

## 2025-08-21 DIAGNOSIS — M25.551 HIP PAIN, BILATERAL: ICD-10-CM

## 2025-08-21 DIAGNOSIS — M25.552 HIP PAIN, BILATERAL: ICD-10-CM

## 2025-08-21 DIAGNOSIS — Z79.890 ENCOUNTER FOR MONITORING TESTOSTERONE REPLACEMENT THERAPY: Primary | ICD-10-CM

## 2025-08-21 PROCEDURE — 99213 OFFICE O/P EST LOW 20 MIN: CPT | Performed by: FAMILY MEDICINE

## 2025-08-21 RX ORDER — TESTOSTERONE 20.25 MG/1.25G
1 GEL TOPICAL DAILY
Qty: 225 G | Refills: 1 | Status: SHIPPED | OUTPATIENT
Start: 2025-08-21 | End: 2026-02-17

## (undated) DEVICE — TOWEL,OR,DSP,ST,BLUE,STD,6/PK,12PK/CS: Brand: MEDLINE

## (undated) DEVICE — SPONGE,TONSIL,DBL STRNG,XRAY,MED,1",STRL: Brand: MEDLINE INDUSTRIES, INC.

## (undated) DEVICE — ELECTRODE PT RET AD L9FT HI MOIST COND ADH HYDRGEL CORDED

## (undated) DEVICE — 4-PORT MANIFOLD: Brand: NEPTUNE 2

## (undated) DEVICE — BASIC SINGLE BASIN 1-LF: Brand: MEDLINE INDUSTRIES, INC.

## (undated) DEVICE — GLOVE ORANGE PI 8 1/2   MSG9085

## (undated) DEVICE — KIT,ANTI FOG,W/SPONGE & FLUID,SOFT PACK: Brand: MEDLINE

## (undated) DEVICE — DUAL LUMEN STOMACH TUBE: Brand: SALEM SUMP

## (undated) DEVICE — SYRINGE,EAR/ULCER, 2 OZ, STERILE: Brand: MEDLINE

## (undated) DEVICE — SOLUTION IRRIG 1000ML 0.9% SOD CHL USP POUR PLAS BTL

## (undated) DEVICE — SOLUTION IV IRRIG 500ML 0.9% SODIUM CHL 2F7123

## (undated) DEVICE — SOLUTION IV 500ML 0.9% SOD CHL PH 5 INJ USP VIAFLX PLAS

## (undated) DEVICE — CATHETER,RED SUCT,POLY-CATH,10: Brand: MEDLINE INDUSTRIES, INC.

## (undated) DEVICE — TUBING, SUCTION, 3/16" X 12', STRAIGHT: Brand: MEDLINE

## (undated) DEVICE — LIGHT SOURCE WHT

## (undated) DEVICE — EVAC 70 XTRA WAND: Brand: COBLATION

## (undated) DEVICE — COAGULATOR SUCT 10FR LAIN FTSWCH ACTIVATION DISP VALLEYLAB

## (undated) DEVICE — SURGICAL PROCEDURE PACK EENT CUST

## (undated) DEVICE — MARKER,SKIN,WI/RULER AND LABELS: Brand: MEDLINE

## (undated) DEVICE — SOLUTION IRRIG 1000ML STRL H2O USP PLAS POUR BTL

## (undated) DEVICE — GLOVE SURG SZ 75 L12IN FNGR THK94MIL TRNSLUC YEL LTX